# Patient Record
(demographics unavailable — no encounter records)

---

## 2024-10-30 NOTE — DISCUSSION/SUMMARY
[Normal Growth] : growth [Normal Development] : development  [No Elimination Concerns] : elimination [Continue Regimen] : feeding [No Skin Concerns] : skin [Normal Sleep Pattern] : sleep [None] : no medical problems [Anticipatory Guidance Given] : Anticipatory guidance addressed as per the history of present illness section [Family Functioning] : family functioning [Nutritional Adequacy and Growth] : nutritional adequacy and growth [Infant Development] : infant development [Oral Health] : oral health [Safety] : safety [Age Approp Vaccines] : Age appropriate vaccines administered [No Medications] : ~He/She~ is not on any medications [Parent/Guardian] : Parent/Guardian [] : The components of the vaccine(s) to be administered today are listed in the plan of care. The disease(s) for which the vaccine(s) are intended to prevent and the risks have been discussed with the caretaker.  The risks are also included in the appropriate vaccination information statements which have been provided to the patient's caregiver.  The caregiver has given consent to vaccinate. [FreeTextEntry1] : 4 month old for WCC visit. Well appearing infant on exam in no acute distress. Age-appropriate anticipatory guidance provided.   -Vaccines as ordered -CMPA: Continue Enfamil Nutramigen -COLE: Reflux precautions reviewed. Call clinic if new/worsening symptoms for re-evaluation  -RTC for next WCC visit/sooner if any concerns arise

## 2024-10-30 NOTE — PHYSICAL EXAM
[Alert] : alert [Normocephalic] : normocephalic [Flat Open Anterior [place]] : flat open anterior fontanelle [Red Reflex] : red reflex bilateral [PERRL] : PERRL [Normally Placed Ears] : normally placed ears [Auricles Well Formed] : auricles well formed [Bony landmarks visible] : bony landmarks visible [Nares Patent] : nares patent [Palate Intact] : palate intact [Uvula Midline] : uvula midline [Symmetric Chest Rise] : symmetric chest rise [Clear to Auscultation Bilaterally] : clear to auscultation bilaterally [Regular Rate and Rhythm] : regular rate and rhythm [S1, S2 present] : S1, S2 present [+2 Femoral Pulses] : (+) 2 femoral pulses [Soft] : soft [Bowel Sounds] : bowel sounds present [External Genitalia] : normal external genitalia [Normal Vaginal Introitus] : normal vaginal introitus [Patent] : patent [Normally Placed] : normally placed [No Abnormal Lymph Nodes Palpated] : no abnormal lymph nodes palpated [Startle Reflex] : startle reflex present [Plantar Grasp] : plantar grasp reflex present [Symmetric Bay] : symmetric bay [Acute Distress] : no acute distress [Discharge] : no discharge [Palpable Masses] : no palpable masses [Murmurs] : no murmurs [Tender] : nontender [Distended] : nondistended [Hepatomegaly] : no hepatomegaly [Splenomegaly] : no splenomegaly [Clitoromegaly] : no clitoromegaly [Montgomery-Ortolani] : negative Montgomery-Ortolani [Allis Sign] : negative Allis sign [Spinal Dimple] : no spinal dimple [Tuft of Hair] : no tuft of hair [Rash or Lesions] : no rash/lesions

## 2024-10-30 NOTE — HISTORY OF PRESENT ILLNESS
[Mother] : mother [Well-balanced] : well-balanced [Normal] : Normal [In Bassinet/Crib] : sleeps in bassinet/crib [On back] : sleeps on back [Pacifier use] : Pacifier use [Tummy time] : tummy time [No] : No cigarette smoke exposure [Water heater temperature set at <120 degrees F] : Water heater temperature set at <120 degrees F [Rear facing car seat in back seat] : Rear facing car seat in back seat [Carbon Monoxide Detectors] : Carbon monoxide detectors at home [Smoke Detectors] : Smoke detectors at home. [Co-sleeping] : no co-sleeping [FreeTextEntry7] : 4 month C visit [de-identified] : Tolerating Nutramigen well. Still has spit ups but less frequent and severe than before.  [de-identified] : Enfamil Nutramigen 4oz every 3 hours

## 2024-12-05 NOTE — END OF VISIT
no [Time Spent: ___ minutes] : I have spent [unfilled] minutes of time on the encounter which excludes teaching and separately reported services.

## 2024-12-05 NOTE — DISCUSSION/SUMMARY
[FreeTextEntry1] : JOSÉ MIGUEL WHIPPLE is a 5 month old girl who has acute viral URI, well appearing on exam  RVP sent Nasal saline, cool mist humidifier Supportive care, fluids, fever management;  Return to clinic or to ER if persistent fever, ear pain, SOB, AMS, decreased PO intake/ UOP

## 2024-12-05 NOTE — HISTORY OF PRESENT ILLNESS
[FreeTextEntry6] : Patient was well until 1 week ago with congestion and coughing, had fever the first day to 100F, no more fever since. Patient is active, playful, normal appetite, urinating and stooling well no F/V/D/abd pain/rash, no sore throat, no ear pain, no difficulty breathing + ill contact -- father has asthma and coughing. no recent travel

## 2025-01-28 NOTE — DEVELOPMENTAL MILESTONES
[Pats or smiles at reflection] : pats or smiles at reflection [Begins to turn when name called] : begins to turn when name called [Babbles] : babbles [Rolls over prone to supine] : rolls over prone to supine [Sits briefly without support] : sits briefly without support [Reaches for object and transfers] : reaches for object and transfers [Rakes small object with 4 fingers] : rakes small object with 4 fingers [[place] small object on surface] : bangs small object on surface [Passed] : passed

## 2025-01-28 NOTE — PHYSICAL EXAM
[Alert] : alert [Normocephalic] : normocephalic [Flat Open Anterior [place]] : flat open anterior fontanelle [Red Reflex] : red reflex bilateral [PERRL] : PERRL [Normally Placed Ears] : normally placed ears [Auricles Well Formed] : auricles well formed [Clear Tympanic membranes] : clear tympanic membranes [Light reflex present] : light reflex present [Bony landmarks visible] : bony landmarks visible [Nares Patent] : nares patent [Palate Intact] : palate intact [Uvula Midline] : uvula midline [Supple, full passive range of motion] : supple, full passive range of motion [Symmetric Chest Rise] : symmetric chest rise [Clear to Auscultation Bilaterally] : clear to auscultation bilaterally [Regular Rate and Rhythm] : regular rate and rhythm [S1, S2 present] : S1, S2 present [+2 Femoral Pulses] : (+) 2 femoral pulses [Soft] : soft [Bowel Sounds] : bowel sounds present [Normal External Genitalia] : normal external genitalia [Normal Vaginal Introitus] : normal vaginal introitus [Patent] : patent [Normally Placed] : normally placed [No Abnormal Lymph Nodes Palpated] : no abnormal lymph nodes palpated [Symmetric Buttocks Creases] : symmetric buttocks creases [Plantar Grasp] : plantar grasp reflex present [Cranial Nerves Grossly Intact] : cranial nerves grossly intact [Acute Distress] : no acute distress [Discharge] : no discharge [Tooth Eruption] : no tooth eruption [Palpable Masses] : no palpable masses [Murmurs] : no murmurs [Tender] : nontender [Distended] : nondistended [Hepatomegaly] : no hepatomegaly [Splenomegaly] : no splenomegaly [Clitoromegaly] : no clitoromegaly [Spinal Dimple] : no spinal dimple [Tuft of Hair] : no tuft of hair [Rash or Lesions] : no rash/lesions

## 2025-01-28 NOTE — DISCUSSION/SUMMARY
[Family Functioning] : family functioning [Nutrition and Feeding] : nutrition and feeding [Infant Development] : infant development [Oral Health] : oral health [Safety] : safety [] : The components of the vaccine(s) to be administered today are listed in the plan of care. The disease(s) for which the vaccine(s) are intended to prevent and the risks have been discussed with the caretaker.  The risks are also included in the appropriate vaccination information statements which have been provided to the patient's caregiver.  The caregiver has given consent to vaccinate. [FreeTextEntry1] : 6 month old for WCC visit. Well appearing infant on exam in no acute distress. Age-appropriate anticipatory guidance provided.   -Vaccines as ordered. RTC for flu booster.  -RTC for next WCC visit/sooner if any concerns arise

## 2025-01-28 NOTE — HISTORY OF PRESENT ILLNESS
[Mother] : mother [Well-balanced] : well-balanced [Formula ___ oz/feed] : [unfilled] oz of formula per feed [Hours between feeds ___] : Child is fed every [unfilled] hours [Fruits] : fruits [Vegetables] : vegetables [Cereal] : cereal [Normal] : Normal [In Bassinet/Crib] : sleeps in bassinet/crib [On back] : sleeps on back [Pacifier use] : Pacifier use [Tummy time] : tummy time [Carbon Monoxide Detectors] : Carbon monoxide detectors at home [Smoke Detectors] : Smoke detectors at home. [Co-sleeping] : no co-sleeping [de-identified] : 6 month [facility] visit [de-identified] : No acute concerns  [de-identified] : Takes Enfamil Nutramigen

## 2025-02-24 NOTE — PHYSICAL EXAM
[Erythema] : erythema [Clear Effusion] : clear effusion [Erythematous Oropharynx] : erythematous oropharynx [NL] : warm, clear [Clear] : left tympanic membrane not clear

## 2025-02-24 NOTE — HISTORY OF PRESENT ILLNESS
[FreeTextEntry6] : patient is here today because she has had a cough and congestion today .her appetite has been down but she seems playful. there has been no n/v/d/rash . she was given tylenol for a tactile fever. her appetite has been down a bit down

## 2025-04-11 NOTE — HISTORY OF PRESENT ILLNESS
[de-identified] : Ear pulling [FreeTextEntry6] : 9 month old female presenting with ear pulling noted for past few days. Had ear infection about 2 months ago, treated with antibiotics. No fevers, cough, nasal congestion, vomiting, diarrhea, rash reported. Taking PO well. Mother does report runny nose, sneezing, and watery eyes. Also has noted she is sneezing more.

## 2025-04-11 NOTE — DISCUSSION/SUMMARY
[FreeTextEntry1] : 9 month old female presenting with symptoms listed above. On exam well appearing infant in no acute distress. Combination of symptoms reported by mother may possibly be due to seasonal/environmental allergies.  -Recommend trial of oral antihistamine use -Call/RTC as needed

## 2025-05-16 NOTE — DEVELOPMENTAL MILESTONES
[Normal Development] : Normal Development [Uses basic gestures] : uses basic gestures [Says "Soham" or "Mama"] : says "Soham" or "Mama" nonspecifically [Sits well without support] : sits well without support [Transitions between sitting and lying] : transitions between sitting and lying [Balances on hands and knees] : balances on hands and knees [Picks up small objects with 3 fingers] : picks up small objects with 3 fingers and thumb [Releases objects intentionally] : releases objects intentionally [[place] objects together] : bangs objects together [Yes] : Completed.

## 2025-05-16 NOTE — HISTORY OF PRESENT ILLNESS
[Mother] : mother [Well-balanced] : well-balanced [Normal] : Normal [In Crib] : sleeps in crib [On back] : sleeps on back [Co-sleeping] : no co-sleeping [Sleeps 12-16 hours per 24 hours (including naps)] : sleeps 12-16 hours per 24 hours (including naps) [Pacifier use] : Pacifier use [No] : No cigarette smoke exposure [Water heater temperature set at <120 degrees F] : Water heater temperature set at <120 degrees F [Rear facing car seat in  back seat] : Rear facing car seat in  back seat [Carbon Monoxide Detectors] : Carbon monoxide detectors [Smoke Detectors] : Smoke detectors [FreeTextEntry7] : [facility] Visit  [de-identified] : No acute concerns

## 2025-05-16 NOTE — DISCUSSION/SUMMARY
[Family Adaptation] : family adaptation [Infant [place]] : infant independence [Feeding Routine] : feeding routine [Safety] : safety [] : The components of the vaccine(s) to be administered today are listed in the plan of care. The disease(s) for which the vaccine(s) are intended to prevent and the risks have been discussed with the caretaker.  The risks are also included in the appropriate vaccination information statements which have been provided to the patient's caregiver.  The caregiver has given consent to vaccinate. [FreeTextEntry1] : 9 month old for WCC visit. Well appearing infant on exam in no acute distress. Age-appropriate anticipatory guidance provided. Growth and development appropriate for age.   -Vaccines as ordered: Hep B #3 and Flu booster  -RTC for next WCC visit/sooner if any concerns arise

## 2025-05-16 NOTE — PHYSICAL EXAM
[Alert] : alert [Acute Distress] : no acute distress [Normocephalic] : normocephalic [Flat Open Anterior [place]] : flat open anterior fontanelle [Red Reflex] : red reflex bilateral [Excessive Tearing] : no excessive tearing [PERRL] : PERRL [Normally Placed Ears] : normally placed ears [Auricles Well Formed] : auricles well formed [Bony landmarks visible] : bony landmarks visible [Discharge] : no discharge [Nares Patent] : nares patent [Palate Intact] : palate intact [Uvula Midline] : uvula midline [Supple, full passive range of motion] : supple, full passive range of motion [Palpable Masses] : no palpable masses [Symmetric Chest Rise] : symmetric chest rise [Clear to Auscultation Bilaterally] : clear to auscultation bilaterally [Regular Rate and Rhythm] : regular rate and rhythm [S1, S2 present] : S1, S2 present [Murmurs] : no murmurs [+2 Femoral Pulses] : (+) 2 femoral pulses [Soft] : soft [Tender] : nontender [Distended] : nondistended [Bowel Sounds] : bowel sounds present [Hepatomegaly] : no hepatomegaly [Splenomegaly] : no splenomegaly [Normal External Genitalia] : normal external genitalia [Clitoromegaly] : no clitoromegaly [Normal Vaginal Introitus] : normal vaginal introitus [No Abnormal Lymph Nodes Palpated] : no abnormal lymph nodes palpated [Symmetric abduction and rotation of hips] : symmetric abduction and rotation of hips [Allis Sign] : negative Allis sign [Straight] : straight [Cranial Nerves Grossly Intact] : cranial nerves grossly intact [Rash or Lesions] : no rash/lesions

## 2025-06-14 NOTE — BEGINNING OF VISIT
[Mother] : mother
In supine, dem MLO, turns head toward stimulus; supported sitting, demo's hands together, age appropriate head usman; prone deferred 2/2 recent cardiac sx

## 2025-06-14 NOTE — HISTORY OF PRESENT ILLNESS
[de-identified] : Fever [FreeTextEntry6] : 11 month old female presenting with fevers that started yesterday. Tmax of 102F at home. Associated with nasal congestion and left ear tugging. No vomiting, diarrhea, rash.  Taking PO well. Making multiple voids per day.

## 2025-06-14 NOTE — PHYSICAL EXAM
[Clear] : right tympanic membrane clear [Erythema] : erythema [Bulging] : bulging [Clear Effusion] : clear effusion [Clear Rhinorrhea] : clear rhinorrhea [NL] : warm, clear

## 2025-06-14 NOTE — DISCUSSION/SUMMARY
[FreeTextEntry1] : 11 month old female presenting with left sided otitis media (second occurrence in same ear in past 4 months, last one in Feb 2025). Well appearing infant at time of exam, in no acute distress.  -PO Amoxicillin course -Tylenol/Motrin PRN for fevers/pain relief -Call/RTC if no improvement noted 48 hours after starting antibiotics

## 2025-07-11 NOTE — HISTORY OF PRESENT ILLNESS
[Mother] : mother [Fruit] : fruit [Vegetables] : vegetables [Meat] : meat [Table food] : table food [Normal] : Normal [Brushing teeth] : Brushing teeth [Playtime] : Playtime  [No] : No cigarette smoke exposure [Water heater temperature set at <120 degrees F] : Water heater temperature set at <120 degrees F [Car seat in back seat] : Car seat in back seat [Smoke Detectors] : Smoke detectors [Carbon Monoxide Detectors] : Carbon monoxide detectors [At risk for exposure to TB] : Not at risk for exposure to Tuberculosis [FreeTextEntry7] : 12 month [facility] visit  [de-identified] : Taking hydrolyzed formula. Mother tried introducing dairy, vomiting with every time

## 2025-07-11 NOTE — PHYSICAL EXAM
[Alert] : alert [Normocephalic] : normocephalic [Closed Anterior [place]] : closed anterior fontanelle [Red Reflex] : red reflex bilateral [PERRL] : PERRL [Normally Placed Ears] : normally placed ears [Auricles Well Formed] : auricles well formed [Bony landmarks visible] : bony landmarks visible [Discharge] : no discharge [Nares Patent] : nares patent [Palate Intact] : palate intact [Uvula Midline] : uvula midline [Tooth Eruption] : tooth eruption [Supple, full passive range of motion] : supple, full passive range of motion [Palpable Masses] : no palpable masses [Symmetric Chest Rise] : symmetric chest rise [Clear to Auscultation Bilaterally] : clear to auscultation bilaterally [Regular Rate and Rhythm] : regular rate and rhythm [S1, S2 present] : S1, S2 present [Murmurs] : no murmurs [+2 Femoral Pulses] : (+) 2 femoral pulses [Soft] : soft [Tender] : nontender [Distended] : nondistended [Bowel Sounds] : normoactive bowel sounds [Hepatomegaly] : no hepatomegaly [Splenomegaly] : no splenomegaly [No Abnormal Lymph Nodes Palpated] : no abnormal lymph nodes palpated [Symmetric Abduction and Rotation of Hips] : symmetric abduction and rotation of hips [Allis Sign] : negative Allis sign [Straight] : straight [Cranial Nerves Grossly Intact] : cranial nerves grossly intact [Rash or Lesions] : no rash/lesions

## 2025-07-11 NOTE — DISCUSSION/SUMMARY
[Family Support] : family support [Establishing Routines] : establishing routines [Feeding and Appetite Changes] : feeding and appetite changes [Establishing A Dental Home] : establishing a dental home [Safety] : safety [] : The components of the vaccine(s) to be administered today are listed in the plan of care. The disease(s) for which the vaccine(s) are intended to prevent and the risks have been discussed with the caretaker.  The risks are also included in the appropriate vaccination information statements which have been provided to the patient's caregiver.  The caregiver has given consent to vaccinate. [FreeTextEntry1] : 12 month old for WCC visit. Well appearing child on exam in no acute distress. Age-appropriate anticipatory guidance provided.   -Vaccines as ordered: MMR, Varicella -Labs as ordered: CBC and lead screening due -RTC for next WCC visit/sooner if any concerns arise